# Patient Record
Sex: MALE
[De-identification: names, ages, dates, MRNs, and addresses within clinical notes are randomized per-mention and may not be internally consistent; named-entity substitution may affect disease eponyms.]

---

## 2018-12-12 PROBLEM — Z00.00 ENCOUNTER FOR PREVENTIVE HEALTH EXAMINATION: Status: ACTIVE | Noted: 2018-12-12

## 2019-01-14 ENCOUNTER — APPOINTMENT (OUTPATIENT)
Dept: CARDIOLOGY | Facility: CLINIC | Age: 79
End: 2019-01-14
Payer: MEDICARE

## 2019-01-14 ENCOUNTER — NON-APPOINTMENT (OUTPATIENT)
Age: 79
End: 2019-01-14

## 2019-01-14 VITALS
RESPIRATION RATE: 14 BRPM | BODY MASS INDEX: 29.37 KG/M2 | SYSTOLIC BLOOD PRESSURE: 118 MMHG | DIASTOLIC BLOOD PRESSURE: 73 MMHG | TEMPERATURE: 97.6 F | HEART RATE: 88 BPM | WEIGHT: 172 LBS | OXYGEN SATURATION: 96 % | HEIGHT: 64 IN

## 2019-01-14 DIAGNOSIS — Z87.891 PERSONAL HISTORY OF NICOTINE DEPENDENCE: ICD-10-CM

## 2019-01-14 DIAGNOSIS — I48.91 UNSPECIFIED ATRIAL FIBRILLATION: ICD-10-CM

## 2019-01-14 DIAGNOSIS — I25.10 ATHEROSCLEROTIC HEART DISEASE OF NATIVE CORONARY ARTERY W/OUT ANGINA PECTORIS: ICD-10-CM

## 2019-01-14 PROCEDURE — 93000 ELECTROCARDIOGRAM COMPLETE: CPT

## 2019-01-14 PROCEDURE — 99204 OFFICE O/P NEW MOD 45 MIN: CPT | Mod: 25

## 2019-01-14 RX ORDER — CARBIDOPA AND LEVODOPA 25; 100 MG/1; MG/1
25-100 TABLET ORAL
Refills: 0 | Status: ACTIVE | COMMUNITY
Start: 2019-01-14

## 2019-01-14 RX ORDER — ISOSORBIDE MONONITRATE 30 MG/1
30 TABLET, EXTENDED RELEASE ORAL
Refills: 0 | Status: ACTIVE | COMMUNITY
Start: 2019-01-14

## 2019-01-14 RX ORDER — APIXABAN 5 MG/1
5 TABLET, FILM COATED ORAL
Refills: 0 | Status: ACTIVE | COMMUNITY
Start: 2019-01-14

## 2019-01-14 RX ORDER — METOPROLOL SUCCINATE 50 MG/1
50 TABLET, EXTENDED RELEASE ORAL
Refills: 0 | Status: ACTIVE | COMMUNITY
Start: 2019-01-14

## 2019-01-14 RX ORDER — MEMANTINE HYDROCHLORIDE 10 MG/1
10 TABLET ORAL
Refills: 0 | Status: ACTIVE | COMMUNITY
Start: 2019-01-14

## 2019-01-14 RX ORDER — ASPIRIN ENTERIC COATED TABLETS 81 MG 81 MG/1
81 TABLET, DELAYED RELEASE ORAL
Refills: 0 | Status: ACTIVE | COMMUNITY
Start: 2019-01-14

## 2019-01-14 RX ORDER — LISINOPRIL 20 MG/1
20 TABLET ORAL
Refills: 0 | Status: ACTIVE | COMMUNITY
Start: 2019-01-14

## 2019-01-14 RX ORDER — ATORVASTATIN CALCIUM 40 MG/1
40 TABLET, FILM COATED ORAL
Refills: 0 | Status: ACTIVE | COMMUNITY
Start: 2019-01-14

## 2019-01-14 NOTE — REVIEW OF SYSTEMS
[Shortness Of Breath] : shortness of breath [Dyspnea on exertion] : dyspnea during exertion [Negative] : Heme/Lymph [Feeling Fatigued] : feeling fatigued [Chest Pain] : no chest pain [Palpitations] : no palpitations

## 2019-01-14 NOTE — DISCUSSION/SUMMARY
[FreeTextEntry1] : IMPRESSION: Mr. Vega is an 78 year old male with Parkinsons known CAD s/p CABG in 2002, multiple stent placements (5-6 stents), HTN, hyperlipidemia, former tobacco use, and new onset atrial fibrillation who presents for a second opinion of his atrial fibrillation.\par \par PLAN:\par 1. His atrial fibrillation is currently adequately rate controlled, however, I have explained to the patient and his family that he may be getting winded if he is developing episodes of tachycardia. I have suggested a 24 hour Holter monitor to evaluate the range of his heart rates. We discussed possible an echocardiogram as well, however, it appears that he recently had an extensive cardiac workup with his cardiologists. We discussed the possibility of needing to increase the dose of his Metoprolol if we need to improve his heart rates. I also discussed the possibility of needing a pacemaker to be placed if he has episodes of bradycardia. For now, he will continue on Toprol XL 50 mg daily for rate control and Eliquis 5 mg twice daily for systemic anticoagulation.  I explained to him that I agree with not cardioverting him if he had a MOISÉS thrombus. \par 2. His blood pressure is well controlled, thus he will continue on his current dosages of Toprol XL, Lisinopril, and Imdur. \par 3. I have suggested that he start on a low dose diuretic as you have previously asked him to do given his lower extremity edema and his HJR. It is possible that the atrial fibrillation has caused him to develop a mild case of heart failure. \par 4. I spent approximately 45 minutes with the patient and his family and more than 50% of the time was spent counseling and coordinating care with regards to his atrial fibrillation.\par 5. I have advised him to follow up with Dr. Padilla (his regular Cardiologist) and Dr. Boone (his Electrophysiologist).

## 2019-01-14 NOTE — HISTORY OF PRESENT ILLNESS
[FreeTextEntry1] : Mr. Vega is an 78 year old male with Parkinsons known CAD s/p CABG in 2002, multiple stent placements (5-6 stents), HTN, hyperlipidemia, former tobacco use, and new onset atrial fibrillation who presents for a second opinion of his atrial fibrillation. He was hospitalized last month for new onset atrial fibrillation, at which time he had a RENNY prior to a possible cardioversion but they found "clots" and were unable to proceed with cardioversion. He reports worsening exertional dyspnea and lower extremity edema. Upon further questioning it appears that he was previously told to take diuretics, which he has not. He denies any episodes of chest pain, palpitations, headaches, dyspnea at rest, or dizziness.

## 2019-01-14 NOTE — PHYSICAL EXAM
[General Appearance - Well Developed] : well developed [Normal Appearance] : normal appearance [General Appearance - Well Nourished] : well nourished [No Deformities] : no deformities [Normal Conjunctiva] : the conjunctiva exhibited no abnormalities [Normal Oral Mucosa] : normal oral mucosa [No Oral Pallor] : no oral pallor [No Oral Cyanosis] : no oral cyanosis [Normal Jugular Venous A Waves Present] : normal jugular venous A waves present [Normal Jugular Venous V Waves Present] : normal jugular venous V waves present [Normal Rate] : normal [Irregularly Irregular] : irregularly irregular [Normal S1] : normal S1 [Normal S2] : normal S2 [No Murmur] : no murmurs heard [___ +] : bilateral [unfilled]U+ pretibial pitting edema [Respiration, Rhythm And Depth] : normal respiratory rhythm and effort [Exaggerated Use Of Accessory Muscles For Inspiration] : no accessory muscle use [Bowel Sounds] : normal bowel sounds [Abdomen Soft] : soft [Abdomen Tenderness] : non-tender [Nail Clubbing] : no clubbing of the fingernails [Cyanosis, Localized] : no localized cyanosis [Petechial Hemorrhages (___cm)] : no petechial hemorrhages [Skin Color & Pigmentation] : normal skin color and pigmentation [] : no rash [Skin Lesions] : no skin lesions [Oriented To Time, Place, And Person] : oriented to person, place, and time [Affect] : the affect was normal [Mood] : the mood was normal [No Anxiety] : not feeling anxious [Well Groomed] : well groomed [General Appearance - In No Acute Distress] : no acute distress [No Skin Ulcers] : no skin ulcer [Right Carotid Bruit] : no bruit heard over the right carotid [Left Carotid Bruit] : no bruit heard over the left carotid [Bruit] : no bruit heard [FreeTextEntry1] : Parkinson's gait.

## 2019-03-14 ENCOUNTER — APPOINTMENT (OUTPATIENT)
Dept: UROLOGY | Facility: CLINIC | Age: 79
End: 2019-03-14